# Patient Record
Sex: FEMALE | Race: WHITE | NOT HISPANIC OR LATINO | Employment: PART TIME | URBAN - METROPOLITAN AREA
[De-identification: names, ages, dates, MRNs, and addresses within clinical notes are randomized per-mention and may not be internally consistent; named-entity substitution may affect disease eponyms.]

---

## 2017-10-11 ENCOUNTER — GENERIC CONVERSION - ENCOUNTER (OUTPATIENT)
Dept: OTHER | Facility: OTHER | Age: 27
End: 2017-10-11

## 2017-12-23 ENCOUNTER — OFFICE VISIT (OUTPATIENT)
Dept: URGENT CARE | Age: 27
End: 2017-12-23
Payer: COMMERCIAL

## 2017-12-23 PROCEDURE — G0382 LEV 3 HOSP TYPE B ED VISIT: HCPCS | Performed by: FAMILY MEDICINE

## 2017-12-26 NOTE — PROGRESS NOTES
Assessment   1  Acute upper respiratory infection (465 9) (J06 9)   2  Pharyngitis (462) (J02 9)    Plan   Pharyngitis    · Amoxicillin 500 MG Oral Capsule; TAKE 1 CAPSULE 3 TIMES DAILY UNTIL GONE    Discussion/Summary   Discussion Summary:    Take antibiotic as directed  Recommend daily probiotic or eating yogurt with live cultures daily while on antibiotic  Warm saltwater gargles for sore throat  May also try throat lozenges, cough drops, Chloraseptic spray and/or ibuprofen or Tylenol for sore throat  Push fluids  Rest  For nasal congestion and drainage, products such as Mucinex D may be helpful for daytime symptoms  1/2 to 1 tablet twice a day with full glass of fluid  Follow up with family doctor if not improving over the next 7-10 days  Chief Complaint   1  Cold Symptoms  Chief Complaint Free Text Note Form: symptoms charted on tuesday  Pt has c/o sinus pressure, non-productive and mild chest discomfort  Denies any fever or chills  History of Present Illness   HPI: 51-year-old female who started getting sick earlier in the week  Complains of sinus pressure, headache, cough, chest tightness  Denies any fever or chills  Nasal congestion and drainage noted  No history of asthma  Taking ibuprofen and vitamin C pills  Smoker  No flu vaccine this year  works in food industry  She says several of her coworkers have had similar symptoms  Hospital Based Practices Required Assessment:      Pain Assessment      the patient states they do not have pain  Abuse And Domestic Violence Screen       Yes, the patient is safe at home  -- The patient states no one is hurting them  Depression And Suicide Screen  No, the patient has not had thoughts of hurting themself  No, the patient has not felt depressed in the past 7 days        Review of Systems   Focused-Female:      Constitutional: No fever, no chills, feels well, no tiredness, no recent weight gain or loss-- and-- as noted in HPI       ENT: sore throat-- and-- nasal discharge, but-- no ear ache, no loss of hearing, no nosebleeds or nasal discharge, no sore throat or hoarseness  Cardiovascular: no complaints of slow or fast heart rate, no chest pain, no palpitations, no leg claudication or lower extremity edema  Respiratory: shortness of breath-- and-- cough  Past Medical History   Active Problems And Past Medical History Reviewed: The active problems and past medical history were reviewed and updated today  Family History   Family History Reviewed: The family history was reviewed and updated today  Social History    · Current every day smoker (305 1) (F17 200)   · Social alcohol use (Z78 9)  Social History Reviewed: The social history was reviewed and updated today  Surgical History   1  History of  Section  Surgical History Reviewed: The surgical history was reviewed and updated today  Current Meds    1  Depo-Provera SUSP; Therapy: (Recorded:[id number]) to Recorded  Medication List Reviewed: The medication list was reviewed and updated today  Allergies   1  No Known Drug Allergies    Vitals   Signs   Recorded: 2017 06:47PM   Temperature: 98 4 F  Heart Rate: 68  Respiration: 20  Systolic: 695  Diastolic: 54  Height: 5 ft 2 in  Weight: 113 lb   BMI Calculated: 20 67  BSA Calculated: 1 5  O2 Saturation: 98  LMP: [id number]  Pain Scale: 0    Physical Exam        Constitutional      General appearance: Abnormal  -- Well-developed well-nourished female in no acute distress but does appear acutely ill  Eyes      Conjunctiva and lids: No swelling, erythema or discharge  Pupils and irises: Equal, round and reactive to light  Ears, Nose, Mouth, and Throat      External inspection of ears and nose: Normal        Otoscopic examination: Tympanic membranes translucent with normal light reflex  Canals patent without erythema         Nasal mucosa, septum, and turbinates: Normal without edema or erythema  Oropharynx: Abnormal  -- Generalized redness of the tonsillar pharyngeal region without fetid breath but some exudate  Cobblestoning noted  Pulmonary      Respiratory effort: No increased work of breathing or signs of respiratory distress  Auscultation of lungs: Clear to auscultation  Cardiovascular      Palpation of heart: Normal PMI, no thrills  Auscultation of heart: Normal rate and rhythm, normal S1 and S2, without murmurs  Psychiatric      Orientation to person, place, and time: Normal        Mood and affect: Normal        Message   Return to work or school:    Jonathan Rocha is under my professional care  She was seen in my office on 12/23/2017        Off work today and tomorrow  Signatures    Electronically signed by : Geovanny Conklin [facility]; Dec 23 2017  7:42PM EST                       (Author)     Electronically signed by :  Geovanny Conklin [facility]; Dec 23 2017  7:43PM EST                       (Author)     Electronically signed by : Josue Akhtar DO; Dec 25 2017  8:40PM EST                       (Co-author)

## 2018-01-14 NOTE — MISCELLANEOUS
Message   Date: 11 Oct 2017 11:54 AM EST, Recorded By: Cordell Schwab For: Huseyin Israel   Caller: Karrierenetta Josémann, Self   Phone: [phone] (Home)   Reason: Medical Complaint   NP- pt is having vaginal itching and burning and wants to get STD testing also    pt will schedule appt           Signatures   Electronically signed by : Ryan Martinez, ; Oct 11 2017 11:54AM EST                       (Author)

## 2018-01-23 VITALS
HEIGHT: 62 IN | OXYGEN SATURATION: 98 % | TEMPERATURE: 98.4 F | DIASTOLIC BLOOD PRESSURE: 54 MMHG | WEIGHT: 113 LBS | SYSTOLIC BLOOD PRESSURE: 110 MMHG | RESPIRATION RATE: 20 BRPM | HEART RATE: 68 BPM | BODY MASS INDEX: 20.8 KG/M2

## 2018-01-24 NOTE — MISCELLANEOUS
Message  Return to work or school:   Chris Vigil is under my professional care  She was seen in my office on 12/23/2017       Off work today and tomorrow  Signatures  Electronically signed by :  Peace Diaz, [facility]; Dec 23 2017  7:43PM EST                       (Author)

## 2020-04-30 ENCOUNTER — HOSPITAL ENCOUNTER (EMERGENCY)
Facility: HOSPITAL | Age: 30
Discharge: HOME | End: 2020-04-30
Attending: EMERGENCY MEDICINE
Payer: COMMERCIAL

## 2020-04-30 VITALS
WEIGHT: 130 LBS | SYSTOLIC BLOOD PRESSURE: 148 MMHG | RESPIRATION RATE: 20 BRPM | OXYGEN SATURATION: 100 % | TEMPERATURE: 97.9 F | DIASTOLIC BLOOD PRESSURE: 92 MMHG | HEART RATE: 100 BPM | BODY MASS INDEX: 23.78 KG/M2

## 2020-04-30 DIAGNOSIS — K04.7 DENTAL ABSCESS: Primary | ICD-10-CM

## 2020-04-30 PROCEDURE — 99284 EMERGENCY DEPT VISIT MOD MDM: CPT | Performed by: EMERGENCY MEDICINE

## 2020-04-30 PROCEDURE — 99283 EMERGENCY DEPT VISIT LOW MDM: CPT

## 2020-04-30 PROCEDURE — 41000 DRAINAGE OF MOUTH LESION: CPT | Performed by: EMERGENCY MEDICINE

## 2020-04-30 RX ORDER — CLINDAMYCIN HYDROCHLORIDE 300 MG/1
300 CAPSULE ORAL EVERY 6 HOURS
Qty: 28 CAPSULE | Refills: 0 | Status: SHIPPED | OUTPATIENT
Start: 2020-04-30 | End: 2020-05-07

## 2020-04-30 RX ORDER — OXYCODONE HYDROCHLORIDE AND ACETAMINOPHEN 5; 325 MG/1; MG/1
1 TABLET ORAL EVERY 4 HOURS PRN
Qty: 8 TABLET | Refills: 0 | Status: SHIPPED | OUTPATIENT
Start: 2020-04-30

## 2020-04-30 RX ORDER — LIDOCAINE HYDROCHLORIDE 10 MG/ML
10 INJECTION, SOLUTION EPIDURAL; INFILTRATION; INTRACAUDAL; PERINEURAL ONCE
Status: COMPLETED | OUTPATIENT
Start: 2020-04-30 | End: 2020-04-30

## 2020-04-30 RX ADMIN — LIDOCAINE HYDROCHLORIDE 10 ML: 10 INJECTION, SOLUTION EPIDURAL; INFILTRATION; INTRACAUDAL; PERINEURAL at 17:52

## 2022-04-18 ENCOUNTER — OFFICE VISIT (OUTPATIENT)
Dept: URGENT CARE | Facility: MEDICAL CENTER | Age: 32
End: 2022-04-18
Payer: MEDICARE

## 2022-04-18 VITALS
BODY MASS INDEX: 20.19 KG/M2 | TEMPERATURE: 97.9 F | RESPIRATION RATE: 12 BRPM | OXYGEN SATURATION: 99 % | HEART RATE: 94 BPM | WEIGHT: 110.4 LBS | SYSTOLIC BLOOD PRESSURE: 138 MMHG | DIASTOLIC BLOOD PRESSURE: 96 MMHG

## 2022-04-18 DIAGNOSIS — N39.0 URINARY TRACT INFECTION WITHOUT HEMATURIA, SITE UNSPECIFIED: Primary | ICD-10-CM

## 2022-04-18 LAB
SL AMB  POCT GLUCOSE, UA: NEGATIVE
SL AMB LEUKOCYTE ESTERASE,UA: NEGATIVE
SL AMB POCT BILIRUBIN,UA: NEGATIVE
SL AMB POCT BLOOD,UA: NEGATIVE
SL AMB POCT CLARITY,UA: CLEAR
SL AMB POCT COLOR,UA: YELLOW
SL AMB POCT KETONES,UA: NEGATIVE
SL AMB POCT NITRITE,UA: NEGATIVE
SL AMB POCT PH,UA: 5
SL AMB POCT SPECIFIC GRAVITY,UA: 1.01
SL AMB POCT URINE PROTEIN: NEGATIVE
SL AMB POCT UROBILINOGEN: 0.2

## 2022-04-18 PROCEDURE — 87086 URINE CULTURE/COLONY COUNT: CPT | Performed by: PHYSICIAN ASSISTANT

## 2022-04-18 PROCEDURE — 81002 URINALYSIS NONAUTO W/O SCOPE: CPT | Performed by: PHYSICIAN ASSISTANT

## 2022-04-18 PROCEDURE — 99213 OFFICE O/P EST LOW 20 MIN: CPT | Performed by: PHYSICIAN ASSISTANT

## 2022-04-18 PROCEDURE — 87147 CULTURE TYPE IMMUNOLOGIC: CPT | Performed by: PHYSICIAN ASSISTANT

## 2022-04-18 RX ORDER — CEPHALEXIN 500 MG/1
500 CAPSULE ORAL EVERY 12 HOURS SCHEDULED
Qty: 14 CAPSULE | Refills: 0 | Status: SHIPPED | OUTPATIENT
Start: 2022-04-18 | End: 2022-04-25

## 2022-04-18 RX ORDER — CEPHALEXIN 500 MG/1
500 CAPSULE ORAL EVERY 12 HOURS SCHEDULED
Qty: 14 CAPSULE | Refills: 0 | Status: SHIPPED | OUTPATIENT
Start: 2022-04-18 | End: 2022-04-18

## 2022-04-18 NOTE — PATIENT INSTRUCTIONS
1  Increase oral fluid consumption  2  Over-the-counter ibuprofen and/or acetaminophen as needed for pain  3  Return here or go to the ER for any worsening symptoms  4  Follow-up with primary care/GYN for any symptoms lasting longer than 5 days

## 2022-04-18 NOTE — PROGRESS NOTES
[facility] [facility]        NAME: Travis Dubon is a 36 y o  female  : 1990    MRN: [id number]  DATE: 2022  TIME: 8:52 AM    Assessment and Plan   Urinary tract infection without hematuria, site unspecified [N39 0]  1  Urinary tract infection without hematuria, site unspecified  POCT urine dip    Urine culture    cephalexin (KEFLEX) 500 mg capsule    DISCONTINUED: cephalexin (KEFLEX) 500 mg capsule         Patient Instructions     1  Increase oral fluid consumption  2  Over-the-counter ibuprofen and/or acetaminophen as needed for pain  3  Return here or go to the ER for any worsening symptoms  4  Follow-up with primary care/GYN for any symptoms lasting longer than 5 days  Chief Complaint   No chief complaint on file  History of Present Illness       1 month history of gradually worsening dysuria, urinary frequency, urinary urgency, urinary hesitancy  Patient also complains of suprapubic pressure and pain intermittently and low back pain  There has been no fever or chills  She denies any vaginal symptoms or discharge  Patient complained of the symptoms to her gyn at a visit at the end of 2022 and she states she is unsure with the results of her urinalysis were but she was treated for vaginitis and not for UTI  Review of Systems   Review of Systems   Constitutional: Negative for chills and fever  HENT: Negative for ear pain and sore throat  Eyes: Negative for pain and visual disturbance  Respiratory: Negative for cough and shortness of breath  Cardiovascular: Negative for chest pain and palpitations  Gastrointestinal: Negative for abdominal pain and vomiting  Genitourinary: Positive for dysuria, frequency and urgency  Negative for hematuria, vaginal bleeding, vaginal discharge and vaginal pain  Musculoskeletal: Negative for arthralgias and back pain  Skin: Negative for color change and rash  Neurological: Negative for seizures and syncope  All other systems reviewed and are negative  Current Medications       Current Outpatient Medications:     cephalexin (KEFLEX) 500 mg capsule, Take 1 capsule (500 mg total) by mouth every 12 (twelve) hours for 7 days, Disp: 14 capsule, Rfl: 0    oxyCODONE-acetaminophen (PERCOCET) 5-325 mg per tablet, Take 1 tablet by mouth every 4 (four) hours as needed for moderate pain for up to 12 dosesMax Daily Amount: 6 tablets, Disp: 8 tablet, Rfl: 0    Current Allergies     Allergies as of 04/18/2022    (No Known Allergies)            The following portions of the patient's history were reviewed and updated as appropriate: allergies, current medications, past family history, past medical history, past social history, past surgical history and problem list      No past medical history on file  No past surgical history on file  No family history on file  Medications have been verified  Objective   /96 (BP Location: Right arm)   Pulse 94   Temp 97 9 °F (36 6 °C) (Temporal)   Resp 12   Wt 50 1 kg (110 lb 6 4 oz)   SpO2 99%   BMI 20 19 kg/m²        Physical Exam     Physical Exam  Vitals and nursing note reviewed  Constitutional:       Appearance: Normal appearance  HENT:      Head: Normocephalic  Nose: Nose normal    Eyes:      Conjunctiva/sclera: Conjunctivae normal       Pupils: Pupils are equal, round, and reactive to light  Cardiovascular:      Rate and Rhythm: Normal rate and regular rhythm  Pulmonary:      Effort: Pulmonary effort is normal       Breath sounds: Normal breath sounds  Abdominal:      General: Abdomen is flat  Palpations: Abdomen is soft  Tenderness: There is no abdominal tenderness  There is no right CVA tenderness or left CVA tenderness  Musculoskeletal:         General: Normal range of motion  Cervical back: Normal range of motion  Skin:     General: Skin is warm and dry  Neurological:      Mental Status: She is alert  Medical decision making note:   Despite negative urinalysis here in the clinic, her symptoms suggest UTI and will treat as such with antibiotics pending the culture results

## 2022-04-20 LAB
BACTERIA UR CULT: ABNORMAL
BACTERIA UR CULT: ABNORMAL